# Patient Record
Sex: FEMALE | URBAN - METROPOLITAN AREA
[De-identification: names, ages, dates, MRNs, and addresses within clinical notes are randomized per-mention and may not be internally consistent; named-entity substitution may affect disease eponyms.]

---

## 2019-12-09 LAB
ANTIBODY SCREEN, EXTERNAL: NEGATIVE
CHLAMYDIA, EXTERNAL: NEGATIVE
HBSAG, EXTERNAL: NEGATIVE
HIV, EXTERNAL: NEGATIVE
N. GONORRHEA, EXTERNAL: NEGATIVE
RPR, EXTERNAL: NON REACTIVE
RUBELLA, EXTERNAL: NORMAL
RUBELLA, EXTERNAL: NORMAL
TYPE, ABO & RH, EXTERNAL: NORMAL

## 2020-04-29 ENCOUNTER — HOSPITAL ENCOUNTER (INPATIENT)
Age: 30
LOS: 2 days | Discharge: HOME | DRG: 560 | End: 2020-05-01
Attending: OBSTETRICS & GYNECOLOGY | Admitting: OBSTETRICS & GYNECOLOGY
Payer: MEDICAID

## 2020-04-29 ENCOUNTER — ANESTHESIA (OUTPATIENT)
Dept: LABOR AND DELIVERY | Age: 30
DRG: 560 | End: 2020-04-29
Payer: MEDICAID

## 2020-04-29 ENCOUNTER — ANESTHESIA EVENT (OUTPATIENT)
Dept: LABOR AND DELIVERY | Age: 30
DRG: 560 | End: 2020-04-29
Payer: MEDICAID

## 2020-04-29 PROBLEM — O99.320 HIGH RISK PREGNANCY DUE TO MATERNAL DRUG ABUSE (HCC): Status: ACTIVE | Noted: 2020-04-29

## 2020-04-29 PROBLEM — Z3A.38 38 WEEKS GESTATION OF PREGNANCY: Status: ACTIVE | Noted: 2020-04-29

## 2020-04-29 PROBLEM — Z33.1 IUP (INTRAUTERINE PREGNANCY), INCIDENTAL: Status: ACTIVE | Noted: 2020-04-29

## 2020-04-29 PROBLEM — F19.10 HIGH RISK PREGNANCY DUE TO MATERNAL DRUG ABUSE (HCC): Status: ACTIVE | Noted: 2020-04-29

## 2020-04-29 LAB
ABO + RH BLD: NORMAL
AMPHET UR QL SCN: NEGATIVE
APPEARANCE UR: ABNORMAL
BARBITURATES UR QL SCN: NEGATIVE
BASOPHILS # BLD: 0 K/UL (ref 0–0.1)
BASOPHILS NFR BLD: 0 % (ref 0–2)
BENZODIAZ UR QL: NEGATIVE
BILIRUB UR QL: NEGATIVE
BLOOD GROUP ANTIBODIES SERPL: NORMAL
CANNABINOIDS UR QL SCN: NEGATIVE
COCAINE UR QL SCN: NEGATIVE
COLOR UR: YELLOW
DAILY QC (YES/NO)?: NO
DIFFERENTIAL METHOD BLD: ABNORMAL
EOSINOPHIL # BLD: 0.1 K/UL (ref 0–0.4)
EOSINOPHIL NFR BLD: 1 % (ref 0–5)
ERYTHROCYTE [DISTWIDTH] IN BLOOD BY AUTOMATED COUNT: 14 % (ref 11.6–14.5)
GLUCOSE UR QL STRIP.AUTO: NEGATIVE MG/DL
HCT VFR BLD AUTO: 33.2 % (ref 35–45)
HDSCOM,HDSCOM: ABNORMAL
HGB BLD-MCNC: 10.8 G/DL (ref 12–16)
KETONES UR-MCNC: NEGATIVE MG/DL
LEUKOCYTE ESTERASE UR QL STRIP: ABNORMAL
LYMPHOCYTES # BLD: 1.5 K/UL (ref 0.9–3.6)
LYMPHOCYTES NFR BLD: 14 % (ref 21–52)
MCH RBC QN AUTO: 26.8 PG (ref 24–34)
MCHC RBC AUTO-ENTMCNC: 32.5 G/DL (ref 31–37)
MCV RBC AUTO: 82.4 FL (ref 74–97)
METHADONE UR QL: POSITIVE
MONOCYTES # BLD: 0.9 K/UL (ref 0.05–1.2)
MONOCYTES NFR BLD: 9 % (ref 3–10)
NEUTS SEG # BLD: 8.5 K/UL (ref 1.8–8)
NEUTS SEG NFR BLD: 76 % (ref 40–73)
NITRITE UR QL: NEGATIVE
OPIATES UR QL: NEGATIVE
PCP UR QL: NEGATIVE
PH UR: 6.5 [PH] (ref 5–9)
PH, VAGINAL FLUID: POSITIVE (ref 5–6.1)
PLATELET # BLD AUTO: 176 K/UL (ref 135–420)
PMV BLD AUTO: 12.8 FL (ref 9.2–11.8)
PROT UR QL: 100 MG/DL
RBC # BLD AUTO: 4.03 M/UL (ref 4.2–5.3)
RBC # UR STRIP: ABNORMAL /UL
SERVICE CMNT-IMP: ABNORMAL
SP GR UR: 1.02 (ref 1–1.02)
SPECIMEN EXP DATE BLD: NORMAL
UROBILINOGEN UR QL: 0.2 EU/DL (ref 0.2–1)
WBC # BLD AUTO: 11.1 K/UL (ref 4.6–13.2)

## 2020-04-29 PROCEDURE — 75410000003 HC RECOV DEL/VAG/CSECN EA 0.5 HR

## 2020-04-29 PROCEDURE — 99284 EMERGENCY DEPT VISIT MOD MDM: CPT

## 2020-04-29 PROCEDURE — 74011000250 HC RX REV CODE- 250

## 2020-04-29 PROCEDURE — 77030040830 HC CATH URETH FOL MDII -A

## 2020-04-29 PROCEDURE — 74011250636 HC RX REV CODE- 250/636: Performed by: ANESTHESIOLOGY

## 2020-04-29 PROCEDURE — 76060000078 HC EPIDURAL ANESTHESIA

## 2020-04-29 PROCEDURE — 83986 ASSAY PH BODY FLUID NOS: CPT | Performed by: ADVANCED PRACTICE MIDWIFE

## 2020-04-29 PROCEDURE — 80307 DRUG TEST PRSMV CHEM ANLYZR: CPT

## 2020-04-29 PROCEDURE — 0UQMXZZ REPAIR VULVA, EXTERNAL APPROACH: ICD-10-PCS | Performed by: ADVANCED PRACTICE MIDWIFE

## 2020-04-29 PROCEDURE — 77030007879 HC KT SPN EPDRL TELE -B: Performed by: ANESTHESIOLOGY

## 2020-04-29 PROCEDURE — 74011250637 HC RX REV CODE- 250/637: Performed by: ADVANCED PRACTICE MIDWIFE

## 2020-04-29 PROCEDURE — 85025 COMPLETE CBC W/AUTO DIFF WBC: CPT

## 2020-04-29 PROCEDURE — 74011250636 HC RX REV CODE- 250/636: Performed by: ADVANCED PRACTICE MIDWIFE

## 2020-04-29 PROCEDURE — 74011250637 HC RX REV CODE- 250/637

## 2020-04-29 PROCEDURE — 65270000029 HC RM PRIVATE

## 2020-04-29 PROCEDURE — 74011000250 HC RX REV CODE- 250: Performed by: ANESTHESIOLOGY

## 2020-04-29 PROCEDURE — 75410000002 HC LABOR FEE PER 1 HR

## 2020-04-29 PROCEDURE — 74011000258 HC RX REV CODE- 258: Performed by: ADVANCED PRACTICE MIDWIFE

## 2020-04-29 PROCEDURE — 86900 BLOOD TYPING SEROLOGIC ABO: CPT

## 2020-04-29 PROCEDURE — 75410000000 HC DELIVERY VAGINAL/SINGLE

## 2020-04-29 PROCEDURE — 00HU33Z INSERTION OF INFUSION DEVICE INTO SPINAL CANAL, PERCUTANEOUS APPROACH: ICD-10-PCS | Performed by: ANESTHESIOLOGY

## 2020-04-29 PROCEDURE — 81003 URINALYSIS AUTO W/O SCOPE: CPT

## 2020-04-29 RX ORDER — SODIUM CHLORIDE 0.9 % (FLUSH) 0.9 %
5-40 SYRINGE (ML) INJECTION EVERY 8 HOURS
Status: DISCONTINUED | OUTPATIENT
Start: 2020-04-29 | End: 2020-04-29 | Stop reason: HOSPADM

## 2020-04-29 RX ORDER — OXYTOCIN/0.9 % SODIUM CHLORIDE 20/1000 ML
125 PLASTIC BAG, INJECTION (ML) INTRAVENOUS CONTINUOUS
Status: DISCONTINUED | OUTPATIENT
Start: 2020-04-29 | End: 2020-04-29 | Stop reason: HOSPADM

## 2020-04-29 RX ORDER — LANOLIN ALCOHOL/MO/W.PET/CERES
CREAM (GRAM) TOPICAL
COMMUNITY

## 2020-04-29 RX ORDER — PROMETHAZINE HYDROCHLORIDE 25 MG/ML
25 INJECTION, SOLUTION INTRAMUSCULAR; INTRAVENOUS
Status: DISCONTINUED | OUTPATIENT
Start: 2020-04-29 | End: 2020-05-01 | Stop reason: HOSPADM

## 2020-04-29 RX ORDER — METHADONE HYDROCHLORIDE 10 MG/1
50 TABLET ORAL DAILY
COMMUNITY

## 2020-04-29 RX ORDER — FENTANYL CITRATE 50 UG/ML
100 INJECTION, SOLUTION INTRAMUSCULAR; INTRAVENOUS ONCE
Status: DISCONTINUED | OUTPATIENT
Start: 2020-04-29 | End: 2020-04-29 | Stop reason: HOSPADM

## 2020-04-29 RX ORDER — AMOXICILLIN 250 MG
1 CAPSULE ORAL
Status: DISCONTINUED | OUTPATIENT
Start: 2020-04-29 | End: 2020-05-01 | Stop reason: HOSPADM

## 2020-04-29 RX ORDER — MISOPROSTOL 100 UG/1
1000 TABLET ORAL
Status: COMPLETED | OUTPATIENT
Start: 2020-04-29 | End: 2020-04-29

## 2020-04-29 RX ORDER — ACETAMINOPHEN 325 MG/1
650 TABLET ORAL
Status: DISCONTINUED | OUTPATIENT
Start: 2020-04-29 | End: 2020-05-01 | Stop reason: HOSPADM

## 2020-04-29 RX ORDER — FENTANYL CITRATE 50 UG/ML
INJECTION, SOLUTION INTRAMUSCULAR; INTRAVENOUS
Status: COMPLETED
Start: 2020-04-29 | End: 2020-04-29

## 2020-04-29 RX ORDER — METHADONE HYDROCHLORIDE 10 MG/ML
50 CONCENTRATE ORAL DAILY
Status: DISCONTINUED | OUTPATIENT
Start: 2020-04-30 | End: 2020-05-01 | Stop reason: HOSPADM

## 2020-04-29 RX ORDER — FENTANYL/ROPIVACAINE/NS/PF 2MCG/ML-.1
PLASTIC BAG, INJECTION (ML) EPIDURAL
Status: COMPLETED
Start: 2020-04-29 | End: 2020-04-29

## 2020-04-29 RX ORDER — SODIUM CHLORIDE, SODIUM LACTATE, POTASSIUM CHLORIDE, CALCIUM CHLORIDE 600; 310; 30; 20 MG/100ML; MG/100ML; MG/100ML; MG/100ML
125 INJECTION, SOLUTION INTRAVENOUS CONTINUOUS
Status: DISCONTINUED | OUTPATIENT
Start: 2020-04-29 | End: 2020-04-29 | Stop reason: HOSPADM

## 2020-04-29 RX ORDER — PHENYLEPHRINE HCL IN 0.9% NACL 1 MG/10 ML
80 SYRINGE (ML) INTRAVENOUS AS NEEDED
Status: DISCONTINUED | OUTPATIENT
Start: 2020-04-29 | End: 2020-04-29 | Stop reason: HOSPADM

## 2020-04-29 RX ORDER — SODIUM CHLORIDE 0.9 % (FLUSH) 0.9 %
5-40 SYRINGE (ML) INJECTION AS NEEDED
Status: DISCONTINUED | OUTPATIENT
Start: 2020-04-29 | End: 2020-04-29 | Stop reason: HOSPADM

## 2020-04-29 RX ORDER — TERBUTALINE SULFATE 1 MG/ML
0.25 INJECTION SUBCUTANEOUS
Status: DISCONTINUED | OUTPATIENT
Start: 2020-04-29 | End: 2020-04-29 | Stop reason: HOSPADM

## 2020-04-29 RX ORDER — LIDOCAINE HYDROCHLORIDE 10 MG/ML
20 INJECTION, SOLUTION EPIDURAL; INFILTRATION; INTRACAUDAL; PERINEURAL AS NEEDED
Status: DISCONTINUED | OUTPATIENT
Start: 2020-04-29 | End: 2020-04-29 | Stop reason: HOSPADM

## 2020-04-29 RX ORDER — OXYTOCIN/0.9 % SODIUM CHLORIDE 20/1000 ML
999 PLASTIC BAG, INJECTION (ML) INTRAVENOUS ONCE
Status: COMPLETED | OUTPATIENT
Start: 2020-04-29 | End: 2020-04-29

## 2020-04-29 RX ORDER — NALOXONE HYDROCHLORIDE 0.4 MG/ML
0.2 INJECTION, SOLUTION INTRAMUSCULAR; INTRAVENOUS; SUBCUTANEOUS AS NEEDED
Status: DISCONTINUED | OUTPATIENT
Start: 2020-04-29 | End: 2020-04-29 | Stop reason: HOSPADM

## 2020-04-29 RX ORDER — DIPHENHYDRAMINE HYDROCHLORIDE 50 MG/ML
12.5 INJECTION, SOLUTION INTRAMUSCULAR; INTRAVENOUS
Status: DISCONTINUED | OUTPATIENT
Start: 2020-04-29 | End: 2020-04-29 | Stop reason: HOSPADM

## 2020-04-29 RX ORDER — OXYTOCIN/0.9 % SODIUM CHLORIDE 30/500 ML
0-20 PLASTIC BAG, INJECTION (ML) INTRAVENOUS
Status: CANCELLED | OUTPATIENT
Start: 2020-04-29

## 2020-04-29 RX ORDER — MISOPROSTOL 100 UG/1
TABLET ORAL
Status: COMPLETED
Start: 2020-04-29 | End: 2020-04-29

## 2020-04-29 RX ORDER — LIDOCAINE HYDROCHLORIDE AND EPINEPHRINE 15; 5 MG/ML; UG/ML
INJECTION, SOLUTION EPIDURAL
Status: COMPLETED | OUTPATIENT
Start: 2020-04-29 | End: 2020-04-29

## 2020-04-29 RX ORDER — IBUPROFEN 400 MG/1
800 TABLET ORAL
Status: DISCONTINUED | OUTPATIENT
Start: 2020-04-29 | End: 2020-05-01 | Stop reason: HOSPADM

## 2020-04-29 RX ORDER — MINERAL OIL
30 OIL (ML) ORAL AS NEEDED
Status: COMPLETED | OUTPATIENT
Start: 2020-04-29 | End: 2020-04-29

## 2020-04-29 RX ORDER — METHYLERGONOVINE MALEATE 0.2 MG/ML
0.2 INJECTION INTRAVENOUS AS NEEDED
Status: DISCONTINUED | OUTPATIENT
Start: 2020-04-29 | End: 2020-04-29 | Stop reason: HOSPADM

## 2020-04-29 RX ORDER — ROPIVACAINE HYDROCHLORIDE 2 MG/ML
INJECTION, SOLUTION EPIDURAL; INFILTRATION; PERINEURAL AS NEEDED
Status: DISCONTINUED | OUTPATIENT
Start: 2020-04-29 | End: 2020-04-29 | Stop reason: HOSPADM

## 2020-04-29 RX ORDER — FENTANYL CITRATE 50 UG/ML
INJECTION, SOLUTION INTRAMUSCULAR; INTRAVENOUS AS NEEDED
Status: DISCONTINUED | OUTPATIENT
Start: 2020-04-29 | End: 2020-04-29 | Stop reason: HOSPADM

## 2020-04-29 RX ORDER — FENTANYL/ROPIVACAINE/NS/PF 2MCG/ML-.1
12 PLASTIC BAG, INJECTION (ML) EPIDURAL
Status: DISCONTINUED | OUTPATIENT
Start: 2020-04-29 | End: 2020-04-29 | Stop reason: HOSPADM

## 2020-04-29 RX ORDER — HYDROMORPHONE HYDROCHLORIDE 1 MG/ML
1 INJECTION, SOLUTION INTRAMUSCULAR; INTRAVENOUS; SUBCUTANEOUS
Status: DISCONTINUED | OUTPATIENT
Start: 2020-04-29 | End: 2020-04-29

## 2020-04-29 RX ADMIN — SODIUM CHLORIDE, SODIUM LACTATE, POTASSIUM CHLORIDE, AND CALCIUM CHLORIDE 125 ML/HR: 600; 310; 30; 20 INJECTION, SOLUTION INTRAVENOUS at 13:45

## 2020-04-29 RX ADMIN — MINERAL 30 ML: 999 OIL ORAL at 18:06

## 2020-04-29 RX ADMIN — Medication 12 ML/HR: at 14:38

## 2020-04-29 RX ADMIN — IBUPROFEN 800 MG: 400 TABLET, FILM COATED ORAL at 21:10

## 2020-04-29 RX ADMIN — ROPIVACAINE HYDROCHLORIDE 5 ML: 2 INJECTION, SOLUTION EPIDURAL; INFILTRATION; PERINEURAL at 14:39

## 2020-04-29 RX ADMIN — Medication 19980 MILLI-UNITS/HR: at 18:11

## 2020-04-29 RX ADMIN — SODIUM CHLORIDE, SODIUM LACTATE, POTASSIUM CHLORIDE, AND CALCIUM CHLORIDE 125 ML/HR: 600; 310; 30; 20 INJECTION, SOLUTION INTRAVENOUS at 11:05

## 2020-04-29 RX ADMIN — FENTANYL CITRATE 100 MCG: 50 INJECTION, SOLUTION INTRAMUSCULAR; INTRAVENOUS at 14:34

## 2020-04-29 RX ADMIN — SODIUM CHLORIDE, SODIUM LACTATE, POTASSIUM CHLORIDE, AND CALCIUM CHLORIDE 125 ML/HR: 600; 310; 30; 20 INJECTION, SOLUTION INTRAVENOUS at 16:29

## 2020-04-29 RX ADMIN — LIDOCAINE HYDROCHLORIDE,EPINEPHRINE BITARTRATE 3 ML: 15; .005 INJECTION, SOLUTION EPIDURAL; INFILTRATION; INTRACAUDAL; PERINEURAL at 14:33

## 2020-04-29 RX ADMIN — MISOPROSTOL 1000 MCG: 100 TABLET ORAL at 18:20

## 2020-04-29 RX ADMIN — SODIUM CHLORIDE 5 MILLION UNITS: 900 INJECTION INTRAVENOUS at 12:54

## 2020-04-29 RX ADMIN — Medication 1 SPRAY: at 20:35

## 2020-04-29 NOTE — ANESTHESIA PROCEDURE NOTES
Epidural Block    Start time: 4/29/2020 2:16 PM  End time: 4/29/2020 2:39 PM  Performed by: Bryson Ortiz MD  Authorized by: Bryson Ortiz MD     Pre-Procedure  Indication: labor epidural    Preanesthetic Checklist: patient identified, risks and benefits discussed, anesthesia consent, site marked, patient being monitored, timeout performed and anesthesia consent    Timeout Time: 14:24        Epidural:   Patient position:  Seated  Prep region:  Lumbar  Prep: Chlorhexidine    Location:  L3-4    Needle and Epidural Catheter:   Needle Type:  Tuohy  Needle Gauge:  17 G  Injection Technique:  Loss of resistance using saline  Attempts:  1  Catheter Size:  19 G  Catheter at Skin Depth (cm):  11  Depth in Epidural Space (cm):  5  Events: no blood with aspiration, no cerebrospinal fluid with aspiration, no paresthesia and negative aspiration test    Test Dose:  Negative    Assessment:   Catheter Secured:  Tegaderm and tape  Insertion:  Uncomplicated  Patient tolerance:  Patient tolerated the procedure well with no immediate complications  No pain or paresthesia noted on placement, threading or bolus.

## 2020-04-29 NOTE — LACTATION NOTE
This note was copied from a baby's chart. 1905 Infant latched and nursing well when [facility] entered the room. Mom educated on breastfeeding basics--hunger cues, feeding on demand, waking baby if baby sleeps too long between feeds, importance of skin to skin, positioning and latching, risk of pacifier use and supplemental feedings, and importance of rooming in--and use of log sheet. Mom also educated on benefits of breastfeeding for herself and baby. Mom verbalized understanding. No questions at this time.

## 2020-04-29 NOTE — PROGRESS NOTES
0953  at 38.0 weeks was brought to the unit in a wheelchair with the complaint of leaking fluid since 0900. Taken to triage 1 and oriented to area. Patient using restroom and changing into gown at this time. [address] [facility], [facility] at bedside. 1014 Nitrazine positive. SVE 90/0. Received orders for admission. [address] signed. 1045 IV attempted    1055 STEFANIE Bocanegra, RN at bedside to initiate IV and draw labs. 1105 IV initiated and labs drawn by STEFANIE Bocanegra, [address] CNM at bedside discussing history of care    0 Dr. Paul Reyes at bedside explaining epidural procedure, side effects, risks, benefits, and positioning. Patient verbalizes understanding. Time-out: 1424  Procedure start: 3160  Catheter in, needle out: 1432  Test dose: 1433  Fentanyl vial handed to MD at bedside. Loading dose: 1434  Patient connected to pump: 1438  Bolus by [id number] Segura placed  1535 SVE /0    1758 CNM at bedside. SVE complete  1800 Segura removed. 400 mL  1806 Pushing started. Mineral oil applied. CNM and RN at bedside monitoring FHT until delivery  1807  of viable female infant. No nuchal noted. No difficulty with the delivery of the shoulders. Infant placed on mother's abdomen. Tactile stimulation and bulb suction applied. 1810 Cord clamped and cut. Infant placed on mother's chest.  181  of placenta. 1824 Repair of bilateral labial lacerations with 4-0 vicryl started. 183 Repair finished. Anig care provided. Ice pack and pad applied. 191 Bedside and Verbal shift change report given to GISSELLE Wynn RN (oncoming nurse) by Nivia Winn RN (offgoing nurse). Report included the following information SBAR, Kardex, Intake/Output, MAR and Recent Results.

## 2020-04-29 NOTE — PROGRESS NOTES
[facility] using SBAR. Assuming care of pt at this time. . Currently breastfeeding baby, lactation consultant at bedside, talking with parents about breastfeeding.

## 2020-04-29 NOTE — L&D DELIVERY NOTE
Delivery Note    Obstetrician:  Brenda Levin CNM    Assistant: none    Pre-Delivery Diagnosis: Term pregnancy, Spontaneous labor and Single fetus    Post-Delivery Diagnosis: Living  infant(s) and Female    Intrapartum Event: None    Procedure: Spontaneous vaginal delivery    Epidural: YES    Monitor:  Fetal Heart Tones - External and Uterine Contractions - External    Indications for instrumental delivery: none    Estimated Blood Loss:     Episiotomy: none    Laceration(s):  Bilateral labial tears    Laceration(s) repair: YES    Presentation: Cephalic    Fetal Description: cooper    Fetal Position: Occiput Anterior    Birth Weight: 7lbs 1oz    Birth Length: not yet assessed    Apgar - One Minute: 9    Apgar - Five Minutes: 9    Umbilical Cord: 3 vessels present  Specimens: placenta delivered intact  Complications:  none           Cord Blood Results:   Information for the patient's :  Anna Au [[id number]]   No results found for: PCTABR, PCTDIG, BILI, ABORH    Prenatal Labs:     Lab Results   Component Value Date/Time    ABO/Rh(D) AB POSITIVE 2020 11:05 AM    HBsAg, External negative 2019    HIV, External negative 2019    Rubella, External non immune 2019    Rubella, External equivocal 2019    RPR, External non reactive 2019    Gonorrhea, External negative 2019    Chlamydia, External negative 2019        Attending Attestation: I was present and scrubbed for the entire procedure

## 2020-04-29 NOTE — ANESTHESIA PREPROCEDURE EVALUATION
Relevant Problems   No relevant active problems       Anesthetic History   No history of anesthetic complications            Review of Systems / Medical History  Patient summary reviewed, nursing notes reviewed and pertinent labs reviewed    Pulmonary              Pertinent negatives: No asthma     Neuro/Psych           Pertinent negatives: No seizures   Cardiovascular                Pertinent negatives: No hypertension  Exercise tolerance: >4 METS     GI/Hepatic/Renal             Pertinent negatives: No liver disease and renal disease   Endo/Other          Pertinent negatives: No diabetes   Other Findings            Physical Exam    Airway  Mallampati: II  TM Distance: 4 - 6 cm  Neck ROM: normal range of motion   Mouth opening: Normal     Cardiovascular    Rhythm: regular  Rate: normal         Dental  No notable dental hx       Pulmonary  Breath sounds clear to auscultation               Abdominal  GI exam deferred       Other Findings            Anesthetic Plan    ASA: 2  Anesthesia type: epidural          Induction: Intravenous  Anesthetic plan and risks discussed with: Patient      Plan labor epidural.  All risks discussed including but not limited to pain, bleeding, infection, block failure, headache, hypotension, and nerve damage. Patient understands and accepts all risks and agrees with plan to proceed with epidural analgesia.

## 2020-04-29 NOTE — H&P
History & Physical    Name: Tanvi Travis MRN: [id number]  SSN: [id number]    YOB: 1990  Age: 34 y.o. Sex: female        Subjective:     Estimated Date of Delivery: 20  OB History    Para Term  AB Living   2       1     SAB TAB Ectopic Molar Multiple Live Births   1                # Outcome Date GA Lbr Uri/2nd Weight Sex Delivery Anes PTL Lv   2 Current            1 SAB         FD       Ms. Janina Crocker is admitted with pregnancy at 38w0d for Presumptive ROM . Prenatal course was complicated by substance abuse, late access to care, limited prenatal care,  and prior  delivery at 20 weeks. Pt presented to care at  approximately 19 weeks gestation. She completed initial intake visit and morphology US with [facility] in [place]. Pt states she was on Suboxone at the onset of pregnancy however came off in January and relapsed with Heroine in early February x1. Since that time, has initiated Methadone treatment 30mg starting in March. Dose was last increased to 50mg po 5 days prior to admission. Please see prenatal records for details. No past medical history on file. No past surgical history on file. Social History     Occupational History    Not on file   Tobacco Use    Smoking status: Not on file   Substance and Sexual Activity    Alcohol use: Not on file    Drug use: Not on file    Sexual activity: Not on file     No family history on file. No Known Allergies  Prior to Admission medications    Not on File        Review of Systems: A comprehensive review of systems was negative except for that written in the HPI. Objective:     Vitals:  Vitals:    20 1015   Weight: 67.6 kg (149 lb)   Height: 5' 8\" (1.727 m)        Physical Exam:  Perineum: blood absent, amniotic fluid present  Cervical Exam: 4 cm dilated    90% effaced    0 station    Presenting Part: cephalic  Cervical Position: mid position  Membranes:  Spontaneous Rupture of Membranes;  Amniotic Fluid: clear fluid  Fetal Heart Rate: Baseline: 130 per minute  Variability: moderate  Accelerations: yes  Decelerations: none  Uterine contractions: regular, every 4-5 minutes    Prenatal Labs:   Lab Results   Component Value Date/Time    Rubella, External non immune 12/09/2019    Rubella, External equivocal 12/09/2019    HBsAg, External negative 12/09/2019    HIV, External negative 12/09/2019    RPR, External non reactive 12/09/2019    Gonorrhea, External negative 12/09/2019    Chlamydia, External negative 12/09/2019    ABO,Rh AB + 12/09/2019         Assessment/Plan:     Active Problems:    High risk pregnancy due to maternal drug abuse ([facility] [address] (4/29/2020)      38 weeks gestation of pregnancy (4/29/2020)         Plan: Admit for Reassuring fetal status, Labor  Progressing normally, Continue plan for vaginal delivery. Group B Strep was not tested, plan to treat with PCN per protocol.

## 2020-04-30 LAB
HCT VFR BLD AUTO: 31.6 % (ref 35–45)
HGB BLD-MCNC: 10.2 G/DL (ref 12–16)

## 2020-04-30 PROCEDURE — 36415 COLL VENOUS BLD VENIPUNCTURE: CPT

## 2020-04-30 PROCEDURE — 74011250637 HC RX REV CODE- 250/637: Performed by: ADVANCED PRACTICE MIDWIFE

## 2020-04-30 PROCEDURE — 85018 HEMOGLOBIN: CPT

## 2020-04-30 PROCEDURE — 65270000029 HC RM PRIVATE

## 2020-04-30 PROCEDURE — 85014 HEMATOCRIT: CPT

## 2020-04-30 RX ORDER — IBUPROFEN 800 MG/1
800 TABLET ORAL
Qty: 30 TAB | Refills: 1 | Status: SHIPPED | OUTPATIENT
Start: 2020-04-30

## 2020-04-30 RX ADMIN — METHADONE HYDROCHLORIDE 50 MG: 10 CONCENTRATE ORAL at 09:42

## 2020-04-30 RX ADMIN — ACETAMINOPHEN 650 MG: 325 TABLET ORAL at 03:18

## 2020-04-30 RX ADMIN — IBUPROFEN 800 MG: 400 TABLET, FILM COATED ORAL at 10:04

## 2020-04-30 NOTE — PROGRESS NOTES
Problem: Patient Education: Go to Patient Education Activity  Goal: Patient/Family Education  Outcome: Progressing Towards Goal     Problem: Patient Education: Go to Patient Education Activity  Goal: Patient/Family Education  Outcome: Progressing Towards Goal     Problem: Vaginal Delivery: Postpartum Day 1  Goal: Activity/Safety  Outcome: Progressing Towards Goal  Goal: Discharge Planning  Outcome: Progressing Towards Goal  Goal: Treatments/Interventions/Procedures  Outcome: Progressing Towards Goal  Goal: *Vital signs within defined limits  Outcome: Progressing Towards Goal  Goal: *Labs within defined limits  Outcome: Progressing Towards Goal  Goal: *Hemodynamically stable  Outcome: Progressing Towards Goal  Goal: *Optimal pain control at patient's stated goal  Outcome: Progressing Towards Goal  Goal: *Tolerating diet  Outcome: Progressing Towards Goal     Problem: Pain  Goal: *Control of Pain  Outcome: Progressing Towards Goal     Problem: Patient Education: Go to Patient Education Activity  Goal: Patient/Family Education  Outcome: Progressing Towards Goal

## 2020-04-30 NOTE — PROGRESS NOTES
Progress Note    Patient: Kate Patel MRN: [id number]  SSN: [id number]    YOB: 1990  Age: 34 y.o. Sex: female      Subjective:     Postpartum Day: 1 Vaginal Delivery    The patient is without complaints. The patient is ambulating well. The patient is tolerating a normal diet. The baby is well. Objective:      Patient Vitals for the past 8 hrs:   BP Temp Pulse Resp SpO2   04/30/20 0804 113/64 98.4 °F (36.9 °C) 66 18 99 %     LABS: Recent Results (from the past 24 hour(s))   HEMATOCRIT    Collection Time: 04/30/20  3:50 AM   Result Value Ref Range    HCT 31.6 (L) 35.0 - 45.0 %   HEMOGLOBIN    Collection Time: 04/30/20  3:50 AM   Result Value Ref Range    HGB 10.2 (L) 12.0 - 16.0 g/dL       Lab Results   Component Value Date/Time    HGB 10.2 (L) 04/30/2020 03:50 AM     Lab Results   Component Value Date/Time    HCT 31.6 (L) 04/30/2020 03:50 AM      Lochia:  appropriate   Uterine Fundus:   firm, 0     Lab/Data Review: All lab results for the last 24 hours reviewed. Assessment:     Status post: Doing well postpartum vaginal delivery day 1. Plan:     Continue day 1 post-vaginal delivery orders. Postpartum care discussed including diet, ambulation, and actvitiy restrictions. Plan for discharge home tomorrow. Baby will be staying for observation.     Signed By: Michael Guillen CNM     April 30, 2020

## 2020-04-30 NOTE — PROGRESS NOTES
Nandini Reasons in  To see pt- she has explained that a cold pack, dermoplast spray and tylenol or ibuprofen is what her options are[ Aylin has spoken with Dr Janny Win. Pt is now in agreement to try the ibuprofen. Pt in wheelchair,plan to give pt the ibuprofen as soon as she gets to postpartum- pt agreeable. To rm 251, accompanied by Jeffrey Santos.

## 2020-04-30 NOTE — PROGRESS NOTES
Problem: Patient Education: Go to Patient Education Activity Goal: Patient/Family Education Outcome: Progressing Towards Goal 
  
Problem: Patient Education: Go to Patient Education Activity Goal: Patient/Family Education Outcome: Progressing Towards Goal 
  
Problem: Normal : Birth to 24 Hours Goal: Activity/Safety Outcome: Progressing Towards Goal 
Goal: Diagnostic Test/Procedures Outcome: Progressing Towards Goal 
Goal: Nutrition/Diet Outcome: Progressing Towards Goal 
Goal: Respiratory Outcome: Progressing Towards Goal 
Goal: Treatments/Interventions/Procedures Outcome: Progressing Towards Goal 
Goal: *Vital signs within defined limits Outcome: Progressing Towards Goal 
Goal: *Labs within defined limits Outcome: Progressing Towards Goal 
Goal: *Appropriate parent-infant bonding Outcome: Progressing Towards Goal 
Goal: *Tolerating diet Outcome: Progressing Towards Goal 
Goal: *Adequate stool/void Outcome: Progressing Towards Goal 
Goal: *No signs and symptoms of infection Outcome: Progressing Towards Goal

## 2020-04-30 NOTE — LACTATION NOTE
This note was copied from a baby's chart. 1758 per mom, infant latching and nursing well. Discussed normal DOL2 expectations. Encouraged mom to call for assistance if needed.

## 2020-04-30 NOTE — ROUTINE PROCESS
2100- TRANSFER - IN REPORT: 
 
Verbal report received from GISSELLE Wynn RN (name) on Claritza Good  being received from labor and delivery (unit) for routine progression of care Report consisted of patients Situation, Background, Assessment and  
Recommendations(SBAR). Information from the following report(s) SBAR, Kardex, Intake/Output and MAR was reviewed with the receiving nurse. Opportunity for questions and clarification was provided. Assessment completed upon patients arrival to unit and care assumed. Pt. Joined at bedside by significant other and baby. AAOx4. Vital signs stable. Will continue to monitor. Pain 7/10. Educated on pain management. Pain medication administered as ordered. Whiteboard updated. Educated on plan of care and signs and symptoms to report. No further questions on concerns at this time. Fundus firm at U -2, small rubra lochia. No clots noted. Assessment complete. Callbell within reach. Bed in lowest position. 2210 - Rounding complete. Pt to breastfeed at this time. 0005- Pt vital signs stable. Assisted with breastfeeding. Infant latched. 0220- Pt to bathroom, 300 ml void. Educated on venkata care. 0530- Rounding complete. Needs addressed. No further concerns expressed. 0715-  Bedside and Verbal shift change report given to OTILIO Perales RN  (oncoming nurse) by JASON Hernandez (offgoing nurse). Report included the following information SBAR, Kardex, Intake/Output, MAR and Recent Results.

## 2020-04-30 NOTE — PROGRESS NOTES
Pt currently rating pain at 5 on pain scale, so the appropriate pain med[which is 800 mg po ibuprofen] has been offered to her. Pt shaking her head, and stating that will not be enough, declines ibuprofen Ms. Renata Roth has been informed and has been asked to come in to speak with pt.

## 2020-04-30 NOTE — LACTATION NOTE
RN in room, will return. 0930 Per mom, infant latched and nursing well. Will page for feeds. [address] and nursing well at [phone].

## 2020-04-30 NOTE — PROGRESS NOTES
1600 Received care of mother in room, no distress, call bell in reach, FOB @ bedside,  1900 BEDSIDE_VERBAL_RECORDED_WRITTEN: shift change report given to Gurjit Gongora (oncoming nurse) by zara Li (offgoing nurse). Report given with Gabriella ALVARADO and MAR.

## 2020-04-30 NOTE — PROGRESS NOTES
Problem: Lactation Care Plan  Goal: *Infant latching appropriately  Outcome: Progressing Towards Goal  Goal: *Weight loss less than 10% of birth weight  Outcome: Progressing Towards Goal     Problem: Patient Education: Go to Patient Education Activity  Goal: Patient/Family Education  Outcome: Progressing Towards Goal     Problem: Patient Education: Go to Patient Education Activity  Goal: Patient/Family Education  Outcome: Progressing Towards Goal     Problem: Vaginal Delivery: Postpartum Day 1  Goal: Activity/Safety  Outcome: Resolved/Met  Goal: Consults, if ordered  Outcome: Resolved/Met  Goal: Diagnostic Test/Procedures  Outcome: Resolved/Met  Goal: Nutrition/Diet  Outcome: Resolved/Met  Goal: Discharge Planning  Outcome: Resolved/Met  Goal: Medications  Outcome: Resolved/Met  Goal: Treatments/Interventions/Procedures  Outcome: Resolved/Met  Goal: Psychosocial  Outcome: Resolved/Met  Goal: *Vital signs within defined limits  Outcome: Resolved/Met  Goal: *Labs within defined limits  Outcome: Resolved/Met  Goal: *Hemodynamically stable  Outcome: Resolved/Met  Goal: *Optimal pain control at patient's stated goal  Outcome: Resolved/Met  Goal: *Tolerating diet  Outcome: Resolved/Met  Goal: *Performs self breast care  Outcome: Resolved/Met     Problem: Pain  Goal: *Control of Pain  Outcome: Progressing Towards Goal     Problem: Patient Education: Go to Patient Education Activity  Goal: Patient/Family Education  Outcome: Progressing Towards Goal

## 2020-04-30 NOTE — PROGRESS NOTES
1915 Bedside and Verbal shift change report given to TREVOR Devine RN (oncoming nurse) by Pieter Altman RN (offgoing nurse). Report included the following information SBAR, Kardex, Intake/Output, MAR and Recent Results. 2150 Shift assessment complete at this time. Mother fundus firm @U-2 midline with small bleeding. denies clots at this time. Patient currently denies any blurred vision, headache, and nausea or vomiting.     2205 infant returned to room. Armband verified at this time. sneezing and yawning chart given to parents. Explained what parents should be on the lookout for and make sure they note any sneezing, infant sleeping patterns, yawning. Ant regurgitation, loose stools. Mother verbalized understanding. No other needs to be addressed     0015 mom feeding infant at this time. No other needs to be addressed at this time. Mom currently without pain. Will continue to monitor. 0145 infant brought to nursery for DANIELLA scoring. 0210 infant returned to room. Armband verified with mother at this time. Reiterated what to be on the lookout for with baby. Mother verbalized understanding. Infant swaddled, supine in bassinet at this time. No other needs to be addressed at this time. 0445 mom up to bathroom unassisted at this time. No other needs to be addressed at this time. 6670 mother in bed . Taking phone call from STEFANIE [facility]. No other needs to be addressed at this time. 0720 Bedside and Verbal shift change report given to OTILIO Grewal RN (oncoming nurse) by Sincere Fountain RN (offgoing nurse). Report included the following information SBAR, Kardex, Intake/Output, MAR and Recent Results.

## 2020-04-30 NOTE — PROGRESS NOTES
Problem: Lactation Care Plan  Goal: *Infant latching appropriately  Outcome: Progressing Towards Goal  Goal: *Weight loss less than 10% of birth weight  Outcome: Progressing Towards Goal     Problem: Patient Education: Go to Patient Education Activity  Goal: Patient/Family Education  Outcome: Progressing Towards Goal     Problem: Patient Education: Go to Patient Education Activity  Goal: Patient/Family Education  Outcome: Progressing Towards Goal     Problem: Vaginal Delivery: Postpartum Day 1  Goal: Activity/Safety  Outcome: Progressing Towards Goal  Goal: Consults, if ordered  Outcome: Progressing Towards Goal  Goal: Diagnostic Test/Procedures  Outcome: Progressing Towards Goal  Goal: Nutrition/Diet  Outcome: Progressing Towards Goal  Goal: Discharge Planning  Outcome: Progressing Towards Goal  Goal: Medications  Outcome: Progressing Towards Goal  Goal: Treatments/Interventions/Procedures  Outcome: Progressing Towards Goal  Goal: Psychosocial  Outcome: Progressing Towards Goal  Goal: *Vital signs within defined limits  Outcome: Progressing Towards Goal  Goal: *Labs within defined limits  Outcome: Progressing Towards Goal  Goal: *Hemodynamically stable  Outcome: Progressing Towards Goal  Goal: *Optimal pain control at patient's stated goal  Outcome: Progressing Towards Goal  Goal: *Tolerating diet  Outcome: Progressing Towards Goal  Goal: *Performs self breast care  Outcome: Progressing Towards Goal     Problem: Pain  Goal: *Control of Pain  Outcome: Progressing Towards Goal     Problem: Patient Education: Go to Patient Education Activity  Goal: Patient/Family Education  Outcome: Progressing Towards Goal   I will continue to monitor the patient's progress.

## 2020-04-30 NOTE — DISCHARGE SUMMARY
Obstetrical Discharge Summary     Name: Fanny Champion MRN: [id number]  SSN: [id number]    YOB: 1990  Age: 34 y.o. Sex: female      Admit Date: 2020    Discharge Date: 2020    Admitting Physician: Leta Adkins MD     Attending Physician:  Cy Pate MD     Admission Diagnoses: IUP (intrauterine pregnancy), incidental [Z34.90]    Discharge Diagnoses:   Information for the patient's :  Yancy Lares [[id number]]   Delivery of a 3.204 kg female infant via Vaginal, Spontaneous on 2020 at 6:07 PM  by Ashly Delvalle. Apgars were 9  and 9 . Additional Diagnoses:   Hospital Problems  Date Reviewed: 2020          Codes Class Noted POA    High risk pregnancy due to maternal drug abuse ([facility] [address] ICD-10-CM: L79.130, F19.10  ICD-9-CM: 648.40, 305.90  2020 Unknown        38 weeks gestation of pregnancy ICD-10-CM: Z3A.38  ICD-9-CM: V22.2  2020 Unknown             Lab Results   Component Value Date/Time    Rubella, External non immune 2019    Rubella, External equivocal 2019     Immunization(s): There is no immunization history for the selected administration types on file for this patient. Hospital Course: Normal hospital course following the delivery. Patient Instructions:   Current Discharge Medication List      START taking these medications    Details   ibuprofen (MOTRIN) 800 mg tablet Take 1 Tab by mouth every eight (8) hours as needed for Pain. Qty: 30 Tab, Refills: 1         CONTINUE these medications which have NOT CHANGED    Details   ferrous sulfate 325 mg (65 mg iron) tablet Take  by mouth Daily (before breakfast). methadone (DOLOPHINE) 10 mg tablet Take 50 mg by mouth daily. Reference my discharge instructions.     Follow-up Appointments   Procedures    FOLLOW UP VISIT Appointment in: 6 Weeks     Standing Status:   Standing     Number of Occurrences:   1     Order Specific Question:   Appointment in Answer:   6 Weeks      Signed By:  David Lopez CNM     April 30, 2020

## 2020-05-01 VITALS
DIASTOLIC BLOOD PRESSURE: 87 MMHG | BODY MASS INDEX: 22.58 KG/M2 | HEART RATE: 77 BPM | OXYGEN SATURATION: 98 % | TEMPERATURE: 97.9 F | SYSTOLIC BLOOD PRESSURE: 121 MMHG | HEIGHT: 68 IN | RESPIRATION RATE: 17 BRPM | WEIGHT: 149 LBS

## 2020-05-01 PROCEDURE — 74011250636 HC RX REV CODE- 250/636: Performed by: OBSTETRICS & GYNECOLOGY

## 2020-05-01 PROCEDURE — 74011250636 HC RX REV CODE- 250/636: Performed by: ADVANCED PRACTICE MIDWIFE

## 2020-05-01 PROCEDURE — 74011250637 HC RX REV CODE- 250/637: Performed by: ADVANCED PRACTICE MIDWIFE

## 2020-05-01 PROCEDURE — 90707 MMR VACCINE SC: CPT | Performed by: ADVANCED PRACTICE MIDWIFE

## 2020-05-01 PROCEDURE — 90715 TDAP VACCINE 7 YRS/> IM: CPT | Performed by: OBSTETRICS & GYNECOLOGY

## 2020-05-01 RX ADMIN — TETANUS TOXOID, REDUCED DIPHTHERIA TOXOID AND ACELLULAR PERTUSSIS VACCINE, ADSORBED 0.5 ML: 5; 2.5; 8; 8; 2.5 SUSPENSION INTRAMUSCULAR at 12:02

## 2020-05-01 RX ADMIN — MEASLES, MUMPS, AND RUBELLA VIRUS VACCINE LIVE 0.5 ML: 1000; 12500; 1000 INJECTION, POWDER, LYOPHILIZED, FOR SUSPENSION SUBCUTANEOUS at 12:05

## 2020-05-01 RX ADMIN — METHADONE HYDROCHLORIDE 50 MG: 10 CONCENTRATE ORAL at 08:19

## 2020-05-01 NOTE — DISCHARGE INSTRUCTIONS
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 Meals/snacks as desired which are high in fiber and carbohydrates and low in fat and cholesterol. Physical Activity / Restrictions / Safety:     Avoid heavy lifting, no more that 8 lbs. For 2-3 weeks; limit use of stairs to 2 times daily for the first week home. No driving for one week. Avoid intercourse 4-6 weeks, no douching or tampon use. Check with obstetrician before starting or resuming an exercise program.         Discharge Instructions/Special Treatment/Home Care Needs:     Continue prenatal vitamins. Continue to use squirt bottle with warm water on your episiotomy after each bathroom use until bleeding stops. If steri-strips applied to your incision, remove in 7-10 days. Call your doctor for the following:     Fever over 101 degrees by mouth. Vaginal bleeding heavier than a normal menstrual period or clot larger than a golf ball. Red streaks or increased swelling of legs, painful red streaks on your breast.  Painful urination, constipation and increased pain or swelling or discharge with your incision. If you feel extremely anxious or overwhelmed. If you have thoughts of harming yourself and/or your baby. Pain Management:     Pain Management:   Take Acetaminophen (Tylenol) or Ibuprofen (Advil, Motrin), as directed for pain. Use a warm Sitz bath 3 times daily to relieve episiotomy or hemorrhoidal discomfort. Heating pad to  incision as needed. For hemorrhoidal discomfort, use Tucks and Anusol cream as needed and directed. Follow-Up Care:      These are general instructions for a healthy lifestyle:    No smoking/ No tobacco products/ Avoid exposure to second hand smoke    Surgeon General's Warning:  Quitting smoking now greatly reduces serious risk to your health. Obesity, smoking, and sedentary lifestyle greatly increases your risk for illness    A healthy diet, regular physical exercise & weight monitoring are important for maintaining a healthy lifestyle    Recognize signs and symptoms of STROKE:    F-face looks uneven    A-arms unable to move or move unevenly    S-speech slurred or non-existent    T-time-call 911 as soon as signs and symptoms begin-DO NOT go       Back to bed or wait to see if you get better-TIME IS BRAIN.

## 2020-05-01 NOTE — ANESTHESIA POSTPROCEDURE EVALUATION
5/1/2020  8:06 AM    Laboring Epidural Follow-up Note     Referring physician: Marti Rose,*   Patient status post vaginal delivery with labor epidural    Visit Vitals  /72 (BP 1 Location: Right arm, BP Patient Position: At rest)   Pulse 60   Temp 36.7 °C (98.1 °F)   Resp 10   Ht 5' 8\" (1.727 m)   Wt 67.6 kg (149 lb)   SpO2 98%   Breastfeeding Unknown   BMI 22.66 kg/m²       Epidural removed by L&D staff  No sedation, pruritis noted. Adequate analgesia.   No obvious anesthesia complications          Karla Johnson, CRNA

## 2020-05-01 NOTE — PROGRESS NOTES
Problem: Lactation Care Plan  Goal: *Infant latching appropriately  Outcome: Progressing Towards Goal  Goal: *Weight loss less than 10% of birth weight  Outcome: Progressing Towards Goal     Problem: Patient Education: Go to Patient Education Activity  Goal: Patient/Family Education  Outcome: Progressing Towards Goal     Problem: Patient Education: Go to Patient Education Activity  Goal: Patient/Family Education  Outcome: Progressing Towards Goal     Problem: Vaginal Delivery: Day of Deliver-Laboring  Goal: Activity/Safety  Outcome: Progressing Towards Goal  Goal: Consults, if ordered  Outcome: Progressing Towards Goal  Goal: Diagnostic Test/Procedures  Outcome: Progressing Towards Goal  Goal: Nutrition/Diet  Outcome: Progressing Towards Goal  Goal: Discharge Planning  Outcome: Progressing Towards Goal  Goal: Medications  Outcome: Progressing Towards Goal  Goal: Respiratory  Outcome: Progressing Towards Goal  Goal: Treatments/Interventions/Procedures  Outcome: Progressing Towards Goal  Goal: *Vital signs within defined limits  Outcome: Progressing Towards Goal  Goal: *Labs within defined limits  Outcome: Progressing Towards Goal  Goal: *Hemodynamically stable  Outcome: Progressing Towards Goal  Goal: *Optimal pain control at patient's stated goal  Outcome: Progressing Towards Goal     Problem: Pain  Goal: *Control of Pain  Outcome: Progressing Towards Goal     Problem: Patient Education: Go to Patient Education Activity  Goal: Patient/Family Education  Outcome: Progressing Towards Goal     Problem: Falls - Risk of  Goal: *Absence of Falls  Description: Document Sukumar Fall Risk and appropriate interventions in the flowsheet.   Outcome: Progressing Towards Goal  Note: Fall Risk Interventions:                                Problem: Patient Education: Go to Patient Education Activity  Goal: Patient/Family Education  Outcome: Progressing Towards Goal   I will continue to monitor the patient's progress through discharge.

## 2020-05-01 NOTE — LACTATION NOTE
Escorted mom to NICU for feeding with baby. Will set up with pump on return to room. 0940 Infant latching and nursing well, but mom's nipple slightly bruised. Will give gel soothies upon return to room. 1055 Set mom up with double electric breast pump and educated on how to use initiation mode, pump hygiene, and safe milk storage due to infant in NICU. Mom to pump q 3 hours for 15 minutes on initiation mode. Mom verbalized understanding and no questions at this time.

## 2022-12-25 ENCOUNTER — APPOINTMENT (EMERGENCY)
Dept: CT IMAGING | Facility: HOSPITAL | Age: 32
End: 2022-12-25

## 2022-12-25 ENCOUNTER — HOSPITAL ENCOUNTER (EMERGENCY)
Facility: HOSPITAL | Age: 32
Discharge: HOME | End: 2022-12-26
Attending: EMERGENCY MEDICINE

## 2022-12-25 DIAGNOSIS — U07.1 COVID-19 VIRUS INFECTION: ICD-10-CM

## 2022-12-25 DIAGNOSIS — R51.9 HEADACHE: Primary | ICD-10-CM

## 2022-12-25 LAB
ALBUMIN SERPL BCP-MCNC: 4 G/DL (ref 3.5–5)
ALP SERPL-CCNC: 53 U/L (ref 46–116)
ALT SERPL W P-5'-P-CCNC: 10 U/L (ref 12–78)
ANION GAP SERPL CALCULATED.3IONS-SCNC: 12 MMOL/L (ref 4–13)
AST SERPL W P-5'-P-CCNC: 15 U/L (ref 5–45)
BASOPHILS # BLD AUTO: 0.03 THOUSANDS/ÂΜL (ref 0–0.1)
BASOPHILS NFR BLD AUTO: 0 % (ref 0–1)
BILIRUB SERPL-MCNC: 0.2 MG/DL (ref 0.2–1)
BUN SERPL-MCNC: 9 MG/DL (ref 5–25)
CALCIUM SERPL-MCNC: 8.7 MG/DL (ref 8.3–10.1)
CHLORIDE SERPL-SCNC: 101 MMOL/L (ref 96–108)
CO2 SERPL-SCNC: 25 MMOL/L (ref 21–32)
CREAT SERPL-MCNC: 0.7 MG/DL (ref 0.6–1.3)
EOSINOPHIL # BLD AUTO: 0.03 THOUSAND/ÂΜL (ref 0–0.61)
EOSINOPHIL NFR BLD AUTO: 0 % (ref 0–6)
ERYTHROCYTE [DISTWIDTH] IN BLOOD BY AUTOMATED COUNT: 13.9 % (ref 11.6–15.1)
FLUAV RNA RESP QL NAA+PROBE: NEGATIVE
FLUBV RNA RESP QL NAA+PROBE: NEGATIVE
GFR SERPL CREATININE-BSD FRML MDRD: 115 ML/MIN/1.73SQ M
GLUCOSE SERPL-MCNC: 99 MG/DL (ref 65–140)
HCT VFR BLD AUTO: 35.5 % (ref 34.8–46.1)
HGB BLD-MCNC: 11 G/DL (ref 11.5–15.4)
IMM GRANULOCYTES # BLD AUTO: 0.02 THOUSAND/UL (ref 0–0.2)
IMM GRANULOCYTES NFR BLD AUTO: 0 % (ref 0–2)
LACTATE SERPL-SCNC: 1.2 MMOL/L (ref 0.5–2)
LYMPHOCYTES # BLD AUTO: 0.63 THOUSANDS/ÂΜL (ref 0.6–4.47)
LYMPHOCYTES NFR BLD AUTO: 8 % (ref 14–44)
MAGNESIUM SERPL-MCNC: 2 MG/DL (ref 1.6–2.6)
MCH RBC QN AUTO: 25.2 PG (ref 26.8–34.3)
MCHC RBC AUTO-ENTMCNC: 31 G/DL (ref 31.4–37.4)
MCV RBC AUTO: 81 FL (ref 82–98)
MONOCYTES # BLD AUTO: 0.82 THOUSAND/ÂΜL (ref 0.17–1.22)
MONOCYTES NFR BLD AUTO: 11 % (ref 4–12)
NEUTROPHILS # BLD AUTO: 6.01 THOUSANDS/ÂΜL (ref 1.85–7.62)
NEUTS SEG NFR BLD AUTO: 81 % (ref 43–75)
NRBC BLD AUTO-RTO: 0 /100 WBCS
PLATELET # BLD AUTO: 267 THOUSANDS/UL (ref 149–390)
PMV BLD AUTO: 11.3 FL (ref 8.9–12.7)
POTASSIUM SERPL-SCNC: 3.7 MMOL/L (ref 3.5–5.3)
PROT SERPL-MCNC: 8 G/DL (ref 6.4–8.4)
RBC # BLD AUTO: 4.37 MILLION/UL (ref 3.81–5.12)
RSV RNA RESP QL NAA+PROBE: NEGATIVE
SARS-COV-2 RNA RESP QL NAA+PROBE: POSITIVE
SODIUM SERPL-SCNC: 138 MMOL/L (ref 135–147)
WBC # BLD AUTO: 7.54 THOUSAND/UL (ref 4.31–10.16)

## 2022-12-25 RX ORDER — DEXAMETHASONE SODIUM PHOSPHATE 4 MG/ML
10 INJECTION, SOLUTION INTRA-ARTICULAR; INTRALESIONAL; INTRAMUSCULAR; INTRAVENOUS; SOFT TISSUE ONCE
Status: COMPLETED | OUTPATIENT
Start: 2022-12-25 | End: 2022-12-25

## 2022-12-25 RX ORDER — KETOROLAC TROMETHAMINE 30 MG/ML
15 INJECTION, SOLUTION INTRAMUSCULAR; INTRAVENOUS ONCE
Status: COMPLETED | OUTPATIENT
Start: 2022-12-25 | End: 2022-12-25

## 2022-12-25 RX ORDER — VENLAFAXINE HYDROCHLORIDE 150 MG/1
150 CAPSULE, EXTENDED RELEASE ORAL DAILY
COMMUNITY

## 2022-12-25 RX ORDER — METOCLOPRAMIDE HYDROCHLORIDE 5 MG/ML
10 INJECTION INTRAMUSCULAR; INTRAVENOUS ONCE
Status: COMPLETED | OUTPATIENT
Start: 2022-12-25 | End: 2022-12-25

## 2022-12-25 RX ORDER — CLONAZEPAM 0.5 MG/1
0.5 TABLET ORAL 2 TIMES DAILY
COMMUNITY

## 2022-12-25 RX ORDER — DIPHENHYDRAMINE HYDROCHLORIDE 50 MG/ML
50 INJECTION INTRAMUSCULAR; INTRAVENOUS ONCE
Status: COMPLETED | OUTPATIENT
Start: 2022-12-25 | End: 2022-12-25

## 2022-12-25 RX ORDER — ACETAMINOPHEN 325 MG/1
975 TABLET ORAL ONCE
Status: COMPLETED | OUTPATIENT
Start: 2022-12-25 | End: 2022-12-25

## 2022-12-25 RX ADMIN — KETOROLAC TROMETHAMINE 15 MG: 30 INJECTION, SOLUTION INTRAMUSCULAR at 23:48

## 2022-12-25 RX ADMIN — ACETAMINOPHEN 975 MG: 325 TABLET ORAL at 21:56

## 2022-12-25 RX ADMIN — METOCLOPRAMIDE 10 MG: 5 INJECTION, SOLUTION INTRAMUSCULAR; INTRAVENOUS at 21:53

## 2022-12-25 RX ADMIN — SODIUM CHLORIDE 1000 ML: 0.9 INJECTION, SOLUTION INTRAVENOUS at 21:42

## 2022-12-25 RX ADMIN — DIPHENHYDRAMINE HYDROCHLORIDE 50 MG: 50 INJECTION, SOLUTION INTRAMUSCULAR; INTRAVENOUS at 21:48

## 2022-12-25 RX ADMIN — DEXAMETHASONE SODIUM PHOSPHATE 10 MG: 4 INJECTION, SOLUTION INTRAMUSCULAR; INTRAVENOUS at 21:50

## 2022-12-26 VITALS
SYSTOLIC BLOOD PRESSURE: 129 MMHG | DIASTOLIC BLOOD PRESSURE: 77 MMHG | BODY MASS INDEX: 26.66 KG/M2 | RESPIRATION RATE: 22 BRPM | TEMPERATURE: 99.1 F | HEIGHT: 68 IN | WEIGHT: 175.93 LBS | HEART RATE: 79 BPM | OXYGEN SATURATION: 97 %

## 2022-12-26 RX ORDER — ONDANSETRON 4 MG/1
4 TABLET, ORALLY DISINTEGRATING ORAL EVERY 6 HOURS PRN
Qty: 20 TABLET | Refills: 0 | Status: SHIPPED | OUTPATIENT
Start: 2022-12-26

## 2022-12-26 RX ORDER — METHYLPREDNISOLONE 4 MG/1
TABLET ORAL
Qty: 21 TABLET | Refills: 0 | Status: SHIPPED | OUTPATIENT
Start: 2022-12-26

## 2022-12-26 NOTE — ED PROVIDER NOTES
History  Chief Complaint   Patient presents with   • Headache     Started yesterday with headache that "feels like my brain is on fire " Has cough, nausea and generalized body aches  Took advil, tylenol and excedrin but no relief  Last dose was this AM       Patient is a 19-year-old female, who presents emerged from today with a chief complaint of headache, body aches, nasal congestion x2 days  Patient states that her headache started last evening and gradually worsened  Patient states that she did take Tylenol without relief  She states that her headache is generalized, having light and sound sensitivity, nausea  Right and sounds make the headache worse  She states that she feels warm but did not have temperatures at home  She states that yesterday she thought maybe this was viral in nature  States that she called for EMS due to her severe headache  She has no previous migraine history  No recent falls or traumas  Denies any vomiting numbness or tingling, abdominal pain chest pain shortness of breath  Patient is unvaccinated for COVID-19      Headache  Pain location:  Generalized  Quality:  Dull  Radiates to:  Does not radiate  Severity currently:  9/10  Severity at highest:  9/10  Onset quality:  Gradual  Duration:  2 days  Timing:  Constant  Progression:  Worsening  Chronicity:  New  Similar to prior headaches: no    Context: bright light and loud noise    Worsened by:  Light and sound  Ineffective treatments:  Acetaminophen  Associated symptoms: no abdominal pain, no back pain, no cough, no ear pain, no eye pain, no fever, no seizures, no sore throat and no vomiting        Prior to Admission Medications   Prescriptions Last Dose Informant Patient Reported? Taking?    clonazePAM (KlonoPIN) 0 5 mg tablet Past Week  Yes Yes   Sig: Take 0 5 mg by mouth 2 (two) times a day   venlafaxine (EFFEXOR-XR) 150 mg 24 hr capsule 12/25/2022  Yes Yes   Sig: Take 150 mg by mouth daily      Facility-Administered Medications: None       Past Medical History:   Diagnosis Date   • Anxiety    • Bulging of cervical intervertebral disc    • Depression    • TBI (traumatic brain injury)        Past Surgical History:   Procedure Laterality Date   [facility]       History reviewed  No pertinent family history  I have reviewed and agree with the history as documented  E-Cigarette/Vaping     E-Cigarette/Vaping Substances   • Nicotine Yes    • THC No    • CBD No    • Flavoring Yes    • Other No    • Unknown No      Social History     Tobacco Use   • Smoking status: Never   • Smokeless tobacco: Never   Substance Use Topics   • Alcohol use: Yes     Comment: occasional   • Drug use: Not Currently     Comment: quit using heroin back in 2019       Review of Systems   Constitutional: Negative for chills and fever  HENT: Negative for ear pain and sore throat  Eyes: Negative for pain and visual disturbance  Respiratory: Negative for cough and shortness of breath  Cardiovascular: Negative for chest pain and palpitations  Gastrointestinal: Negative for abdominal pain and vomiting  Genitourinary: Negative for dysuria and hematuria  Musculoskeletal: Negative for arthralgias and back pain  Skin: Negative for color change and rash  Neurological: Positive for headaches  Negative for seizures and syncope  All other systems reviewed and are negative  Physical Exam  Physical Exam  Vitals and nursing note reviewed  Constitutional:       General: She is not in acute distress  Appearance: She is well-developed  HENT:      Head: Normocephalic and atraumatic  Eyes:      Extraocular Movements: Extraocular movements intact  Conjunctiva/sclera: Conjunctivae normal       Pupils: Pupils are equal, round, and reactive to light  Cardiovascular:      Rate and Rhythm: Normal rate and regular rhythm  Heart sounds: No murmur heard    Pulmonary:      Effort: Pulmonary effort is normal  No respiratory distress  Breath sounds: Normal breath sounds  Abdominal:      Palpations: Abdomen is soft  Tenderness: There is no abdominal tenderness  There is no right CVA tenderness or left CVA tenderness  Musculoskeletal:         General: No swelling  Cervical back: Normal range of motion and neck supple  Skin:     General: Skin is warm and dry  Capillary Refill: Capillary refill takes less than 2 seconds  Neurological:      General: No focal deficit present  Mental Status: She is alert and oriented to person, place, and time     Psychiatric:         Mood and Affect: Mood normal          Vital Signs  ED Triage Vitals [12/25/22 2045]   Temperature Pulse Respirations Blood Pressure SpO2   (!) 101 3 °F (38 5 °C) 94 18 130/79 100 %      Temp Source Heart Rate Source Patient Position - Orthostatic VS BP Location FiO2 (%)   Temporal Monitor Lying Right arm --      Pain Score       10 - Worst Possible Pain           Vitals:    12/25/22 2145 12/25/22 2200 12/25/22 2230 12/25/22 2245   BP:       Pulse: 95 104 81 92   Patient Position - Orthostatic VS:             Visual Acuity      ED Medications  Medications   sodium chloride 0 9 % bolus 1,000 mL (0 mL Intravenous Stopped 12/25/22 2348)   metoclopramide (REGLAN) injection 10 mg (10 mg Intravenous Given 12/25/22 2153)   diphenhydrAMINE (BENADRYL) injection 50 mg (50 mg Intravenous Given 12/25/22 2148)   dexamethasone (DECADRON) injection 10 mg (10 mg Intravenous Given 12/25/22 2150)   acetaminophen (TYLENOL) tablet 975 mg (975 mg Oral Given 12/25/22 2156)   ketorolac (TORADOL) injection 15 mg (15 mg Intravenous Given 12/25/22 2348)       Diagnostic Studies  Results Reviewed     Procedure Component Value Units Date/Time    FLU/RSV/COVID - if FLU/RSV clinically relevant [[id number]]  (Abnormal) Collected: 12/25/22 2145    Lab Status: Final result Specimen: Nares from Nose Updated: 12/25/22 2236     SARS-CoV-2 Positive     INFLUENZA A PCR Negative INFLUENZA B PCR Negative     RSV PCR Negative    Narrative:      FOR PEDIATRIC PATIENTS - copy/paste COVID Guidelines URL to browser: [email or web address]    SARS-CoV-2 assay is a Nucleic Acid Amplification assay intended for the  qualitative detection of nucleic acid from SARS-CoV-2 in nasopharyngeal  swabs  Results are for the presumptive identification of SARS-CoV-2 RNA  Positive results are indicative of infection with SARS-CoV-2, the virus  causing COVID-19, but do not rule out bacterial infection or co-infection  with other viruses  Laboratories within the [place] and its  territories are required to report all positive results to the appropriate  public health authorities  Negative results do not preclude SARS-CoV-2  infection and should not be used as the sole basis for treatment or other  patient management decisions  Negative results must be combined with  clinical observations, patient history, and epidemiological information  This test has not been FDA cleared or approved  This test has been authorized by FDA under an Emergency Use Authorization  (EUA)  This test is only authorized for the duration of time the  declaration that circumstances exist justifying the authorization of the  emergency use of an in vitro diagnostic tests for detection of SARS-CoV-2  virus and/or diagnosis of COVID-19 infection under section 564(b)(1) of  the Act, 21 U  S C  190IBJ-0(Y)(4), unless the authorization is terminated  or revoked sooner  The test has been validated but independent review by FDA  and CLIA is pending  Test performed using [employer] GeneXpert: This RT-PCR assay targets N2,  a region unique to SARS-CoV-2  A conserved region in the E-gene was chosen  for pan-Sarbecovirus detection which includes SARS-CoV-2  According to CMS-2020-01-R, this platform meets the definition of high-[employer] technology      Comprehensive metabolic panel [[id number]]  (Abnormal) Collected: 12/25/22 2145    Lab Status: Final result Specimen: Blood from Arm, Left Updated: 12/25/22 2223     Sodium 138 mmol/L      Potassium 3 7 mmol/L      Chloride 101 mmol/L      CO2 25 mmol/L      ANION GAP 12 mmol/L      BUN 9 mg/dL      Creatinine 0 70 mg/dL      Glucose 99 mg/dL      Calcium 8 7 mg/dL      AST 15 U/L      ALT 10 U/L      Alkaline Phosphatase 53 U/L      Total Protein 8 0 g/dL      Albumin 4 0 g/dL      Total Bilirubin 0 20 mg/dL      eGFR 115 ml/min/1 73sq m     Narrative:      [facility] guidelines for Chronic Kidney Disease (CKD):   •  Stage 1 with normal or high GFR (GFR > 90 mL/min/1 73 square meters)  •  Stage 2 Mild CKD (GFR = 60-89 mL/min/1 73 square meters)  •  Stage 3A Moderate CKD (GFR = 45-59 mL/min/1 73 square meters)  •  Stage 3B Moderate CKD (GFR = 30-44 mL/min/1 73 square meters)  •  Stage 4 Severe CKD (GFR = 15-29 mL/min/1 73 square meters)  •  Stage 5 End Stage CKD (GFR <15 mL/min/1 73 square meters)  Note: GFR calculation is accurate only with a steady state creatinine    Magnesium [[id number]]  (Normal) Collected: 12/25/22 2145    Lab Status: Final result Specimen: Blood from Arm, Left Updated: 12/25/22 2223     Magnesium 2 0 mg/dL     Lactic acid [[id number]]  (Normal) Collected: 12/25/22 2145    Lab Status: Final result Specimen: Blood from Arm, Left Updated: 12/25/22 2219     LACTIC ACID 1 2 mmol/L     Narrative:      Result may be elevated if tourniquet was used during collection      CBC and differential [[id number]]  (Abnormal) Collected: 12/25/22 2145    Lab Status: Final result Specimen: Blood from Arm, Left Updated: 12/25/22 2200     WBC 7 54 Thousand/uL      RBC 4 37 Million/uL      Hemoglobin 11 0 g/dL      Hematocrit 35 5 %      MCV 81 fL      MCH 25 2 pg      MCHC 31 0 g/dL      RDW 13 9 %      MPV 11 3 fL      Platelets 592 Thousands/uL      nRBC 0 /100 WBCs      Neutrophils Relative 81 %      Immat GRANS % 0 % Lymphocytes Relative 8 %      Monocytes Relative 11 %      Eosinophils Relative 0 %      Basophils Relative 0 %      Neutrophils Absolute 6 01 Thousands/µL      Immature Grans Absolute 0 02 Thousand/uL      Lymphocytes Absolute 0 63 Thousands/µL      Monocytes Absolute 0 82 Thousand/µL      Eosinophils Absolute 0 03 Thousand/µL      Basophils Absolute 0 03 Thousands/µL     UA w Reflex to Microscopic w Reflex to Culture [[id number]]     Lab Status: No result Specimen: Urine                  CT head without contrast    (Results Pending)              Procedures  Procedures         ED Course  ED Course as of 12/26/22 0005   Sun Dec 25, 2022   2334 Patient has improved headache, will having some body aches but much more comfortable at this time                                             MDM  Number of Diagnoses or Management Options  COVID-19 virus infection  Headache  Diagnosis management comments: Positive for COVID-19 virus, headache did improve  Educated on viral syndrome and quarantine         Amount and/or Complexity of Data Reviewed  Clinical lab tests: ordered and reviewed  Tests in the radiology section of CPT®: ordered and reviewed  Decide to obtain previous medical records or to obtain history from someone other than the patient: yes  Review and summarize past medical records: yes  Independent visualization of images, tracings, or specimens: yes    Risk of Complications, Morbidity, and/or Mortality  Presenting problems: moderate  Diagnostic procedures: moderate  Management options: moderate        Disposition  Final diagnoses:   Headache   COVID-19 virus infection     Time reflects when diagnosis was documented in both MDM as applicable and the Disposition within this note     Time User Action Codes Description Comment    12/26/2022 12:04 AM Yolanda Fung [R51 9] Headache     12/26/2022 12:04 AM Yolanda Fung [U07 1] COVID-19 virus infection       ED Disposition     ED Disposition   Discharge Condition   Stable    Date/Time   Mon Dec 26, 2022 12:04 AM    Comment   Randolph Davis discharge to home/self care  Follow-up Information    None         Patient's Medications   Discharge Prescriptions    METHYLPREDNISOLONE 4 MG TABLET THERAPY PACK    Use as directed on package       Start Date: 12/26/2022End Date: --       Order Dose: --       Quantity: 21 tablet    Refills: 0    ONDANSETRON (ZOFRAN ODT) 4 MG DISINTEGRATING TABLET    Take 1 tablet (4 mg total) by mouth every 6 (six) hours as needed for nausea or vomiting       Start Date: 12/26/2022End Date: --       Order Dose: 4 mg       Quantity: 20 tablet    Refills: 0       No discharge procedures on file      PDMP Review     None          ED Provider  Electronically Signed by           Efrain Jordan PA-C  12/26/22 0005

## 2022-12-26 NOTE — ED NOTES
A Fabián RN and MANISH KumarRN attempted top obtain IV site via ultrasound w/o success  Provider aware        Dee Adamson RN  12/25/22 0246